# Patient Record
Sex: MALE | Race: BLACK OR AFRICAN AMERICAN | NOT HISPANIC OR LATINO | URBAN - METROPOLITAN AREA
[De-identification: names, ages, dates, MRNs, and addresses within clinical notes are randomized per-mention and may not be internally consistent; named-entity substitution may affect disease eponyms.]

---

## 2023-05-01 ENCOUNTER — OFFICE VISIT (OUTPATIENT)
Dept: PRIMARY CARE | Facility: CLINIC | Age: 63
End: 2023-05-01
Payer: COMMERCIAL

## 2023-05-01 VITALS
WEIGHT: 271.6 LBS | HEIGHT: 74 IN | BODY MASS INDEX: 34.86 KG/M2 | SYSTOLIC BLOOD PRESSURE: 130 MMHG | DIASTOLIC BLOOD PRESSURE: 80 MMHG | OXYGEN SATURATION: 96 % | HEART RATE: 77 BPM

## 2023-05-01 DIAGNOSIS — R07.89 ATYPICAL CHEST PAIN: ICD-10-CM

## 2023-05-01 DIAGNOSIS — I87.2 CHRONIC VENOUS INSUFFICIENCY OF LOWER EXTREMITY: ICD-10-CM

## 2023-05-01 DIAGNOSIS — E11.9 TYPE 2 DIABETES MELLITUS WITHOUT COMPLICATION, WITHOUT LONG-TERM CURRENT USE OF INSULIN (MULTI): ICD-10-CM

## 2023-05-01 DIAGNOSIS — E11.9 DIABETES MELLITUS WITHOUT COMPLICATION (MULTI): Primary | ICD-10-CM

## 2023-05-01 PROBLEM — E66.9 OBESITY: Status: ACTIVE | Noted: 2023-05-01

## 2023-05-01 PROBLEM — F17.200 NICOTINE DEPENDENCE: Status: ACTIVE | Noted: 2023-05-01

## 2023-05-01 PROBLEM — M10.9 GOUT: Status: ACTIVE | Noted: 2023-05-01

## 2023-05-01 PROCEDURE — 3075F SYST BP GE 130 - 139MM HG: CPT | Performed by: STUDENT IN AN ORGANIZED HEALTH CARE EDUCATION/TRAINING PROGRAM

## 2023-05-01 PROCEDURE — 4004F PT TOBACCO SCREEN RCVD TLK: CPT | Performed by: STUDENT IN AN ORGANIZED HEALTH CARE EDUCATION/TRAINING PROGRAM

## 2023-05-01 PROCEDURE — 3079F DIAST BP 80-89 MM HG: CPT | Performed by: STUDENT IN AN ORGANIZED HEALTH CARE EDUCATION/TRAINING PROGRAM

## 2023-05-01 PROCEDURE — 99214 OFFICE O/P EST MOD 30 MIN: CPT | Performed by: STUDENT IN AN ORGANIZED HEALTH CARE EDUCATION/TRAINING PROGRAM

## 2023-05-01 NOTE — PATIENT INSTRUCTIONS
The swelling in both legs is from chronic venous insufficiency .Use compression stockings and keep your legs elevated . Swelling in your legs over long term if not taken care of can result in ulcers. So, keeping your legs elevated , ideally at the level of your heart when your are sitting down , or lying down ( keeping pillows or a brick under the bedposts at the foot end ) and using compression stockings when moving is important.    Repeating the test for diabetes   advise to cut down on carbs/ starches - bread, rice , potatoes, pasta , sweets, soda etc., and increase physical activity .   Weight loss will  help in decreasing blood sugars  as well.       We are getting a cardiac stress test .   Rpt BP is within normal limits .    We are on a new system that is paperless.     Lab location for blood work :  1. Located across the office , just past the elevators .   [address]. [address].  If you are coming on a Saturday, go to [address] for the blood draw    Radiology : [address] for Xrays  You can also schedule non urgent imaging by calling .     For scheduling appts, call . You might be receiving call from central scheduling as well.    For scheduling colonoscopy, call .     For scheduling physical therapy, call [phone] ( [phone]).    For any other scheduling questions or locations, please ask the medical assistant or the .

## 2023-05-01 NOTE — PROGRESS NOTES
"Subjective   Patient ID: Domenico Boswell is a 62 y.o. male who presents for Follow-up (Pain and swelling in calves. Heart racing, flutters, and pain. ).        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  She is a critical care nurse, travel nurse.  She recommended an echocardiogram ,vascular test with reflux and a hearing test.      Visit Vitals  /80   Pulse 77   Ht 1.88 m (6' 2\")   Wt 123 kg (271 lb 9.6 oz)   SpO2 96%   BMI 34.87 kg/m²   Smoking Status Some Days   BSA 2.53 m²      No LMP for male patient.     Review of Systems    Constitutional : No feeling poorly / fevers/ chills / night sweats/ fatigue   Cardiovascular : No CP /Palpitations/ lower extremity edema / syncope   Respiratory : No Cough /LOMAS/Dyspnea at rest   MSK : As noted in HPI   Endo : No polyuria/polydipsia/ muscle weakness / sluggishness   CNS: No confusion / HA/ tingling/ numbness/ weakness of extremities  Psychiatric: No anxiety/ depression/ SI/HI    All other systems have been reviewed and are negative for complaint       Physical Exam    Constitutional : Vitals reviewed. Alert and in no distress  Cardiovascular : RRR, Normal S1, S2, No pericardial rub/ gallop, no peripheral edema   Pulmonary: No respiratory distress, CTAB   MSK : Normal gait and station , strength and tone   Skin:  hyperpigmentation of the lower extremities just above the ankles b/l noted   Pitting edema in the same area as  well right > left   Neurologic : CNs 2-12 grossly intact , no obvious FNDs  Psych : A,Ox3, normal " mood and affect      Assessment/Plan   Diagnoses and all orders for this visit:  Diabetes mellitus without complication (CMS/[facility])  Type 2 diabetes mellitus without complication, without long-term current use of insulin (CMS/[facility])  -     Hemoglobin A1C; Future  -     Basic Metabolic Panel; Future  Atypical chest pain  -     Echocardiogram stress test; Future  Chronic venous insufficiency of lower extremity  -     Compression Stockings 15-20 mmHg    62-year-old male with diabetes and obesity presents with concerns as noted in HPI.    #Chronic venous insufficiency of both bilateral lower extremities.  Gross has been the consequence of many years of standing on his feet for long hours which is his job requirement.  Compression stockings, foot and elevation advised.  Risk of ulcers if prevention is failed discussed with patient  Arterial and venous blood supply discussed with patient.  Lower extremity concerns is a venous issue      #Atypical chest pain.  Given the cardiovascular risk factors which include diabetes, age and obesity, cardiac stress has been ordered.      #Diabetes type 2: Repeat A1c and BMP to be done today.    #Obesity: Readdressed the need for weight loss.    Return to the office in November for CPE.  Or sooner if A1c is elevated.

## 2023-05-18 ENCOUNTER — TELEPHONE (OUTPATIENT)
Dept: PRIMARY CARE | Facility: CLINIC | Age: 63
End: 2023-05-18
Payer: COMMERCIAL

## 2023-05-18 DIAGNOSIS — R07.89 ATYPICAL CHEST PAIN: Primary | ICD-10-CM

## 2023-05-18 NOTE — TELEPHONE ENCOUNTER
----- Message from Nat Limon MD sent at 5/18/2023 11:23 AM EDT -----  I would like him to see Cardiology for his chest pain , referral placed   Cardiac stress test did not show any concerns

## 2023-08-17 LAB
CHOLESTEROL (MG/DL) IN SER/PLAS: NORMAL
CHOLESTEROL IN HDL (MG/DL) IN SER/PLAS: NORMAL
CHOLESTEROL/HDL RATIO: NORMAL
LDL: NORMAL
NON HDL CHOLESTEROL: NORMAL
TRIGLYCERIDE (MG/DL) IN SER/PLAS: NORMAL
VLDL: NORMAL

## 2023-08-18 LAB — LIPOPROTEIN A: 34 MG/DL

## 2023-09-07 LAB
CHOLESTEROL (MG/DL) IN SER/PLAS: 151 MG/DL (ref 0–199)
CHOLESTEROL IN HDL (MG/DL) IN SER/PLAS: 50.8 MG/DL
CHOLESTEROL/HDL RATIO: 3
LDL: 91 MG/DL (ref 0–99)
TRIGLYCERIDE (MG/DL) IN SER/PLAS: 47 MG/DL (ref 0–149)
VLDL: 9 MG/DL (ref 0–40)

## 2023-11-03 ENCOUNTER — OFFICE VISIT (OUTPATIENT)
Dept: PRIMARY CARE | Facility: CLINIC | Age: 63
End: 2023-11-03
Payer: COMMERCIAL

## 2023-11-03 VITALS
WEIGHT: 266.6 LBS | OXYGEN SATURATION: 95 % | HEIGHT: 74 IN | BODY MASS INDEX: 34.22 KG/M2 | SYSTOLIC BLOOD PRESSURE: 140 MMHG | HEART RATE: 67 BPM | DIASTOLIC BLOOD PRESSURE: 90 MMHG

## 2023-11-03 DIAGNOSIS — Z12.5 SCREENING FOR PROSTATE CANCER: ICD-10-CM

## 2023-11-03 DIAGNOSIS — Z00.00 ROUTINE GENERAL MEDICAL EXAMINATION AT A HEALTH CARE FACILITY: Primary | ICD-10-CM

## 2023-11-03 DIAGNOSIS — E11.9 DIABETES MELLITUS WITHOUT COMPLICATION (MULTI): ICD-10-CM

## 2023-11-03 DIAGNOSIS — R03.0 ELEVATED BLOOD PRESSURE READING: ICD-10-CM

## 2023-11-03 DIAGNOSIS — E11.9 TYPE 2 DIABETES MELLITUS WITHOUT COMPLICATION, WITHOUT LONG-TERM CURRENT USE OF INSULIN (MULTI): ICD-10-CM

## 2023-11-03 LAB
ANION GAP SERPL CALC-SCNC: 12 MMOL/L (ref 10–20)
BUN SERPL-MCNC: 15 MG/DL (ref 6–23)
CALCIUM SERPL-MCNC: 9.2 MG/DL (ref 8.6–10.6)
CHLORIDE SERPL-SCNC: 106 MMOL/L (ref 98–107)
CO2 SERPL-SCNC: 28 MMOL/L (ref 21–32)
CREAT SERPL-MCNC: 1.05 MG/DL (ref 0.5–1.3)
EST. AVERAGE GLUCOSE BLD GHB EST-MCNC: 128 MG/DL
GFR SERPL CREATININE-BSD FRML MDRD: 80 ML/MIN/1.73M*2
GLUCOSE SERPL-MCNC: 76 MG/DL (ref 74–99)
HBA1C MFR BLD: 6.1 %
POTASSIUM SERPL-SCNC: 4.7 MMOL/L (ref 3.5–5.3)
PSA SERPL-MCNC: 0.47 NG/ML
SODIUM SERPL-SCNC: 141 MMOL/L (ref 136–145)

## 2023-11-03 PROCEDURE — 99214 OFFICE O/P EST MOD 30 MIN: CPT | Performed by: STUDENT IN AN ORGANIZED HEALTH CARE EDUCATION/TRAINING PROGRAM

## 2023-11-03 PROCEDURE — 80048 BASIC METABOLIC PNL TOTAL CA: CPT

## 2023-11-03 PROCEDURE — 83036 HEMOGLOBIN GLYCOSYLATED A1C: CPT

## 2023-11-03 PROCEDURE — 84153 ASSAY OF PSA TOTAL: CPT

## 2023-11-03 PROCEDURE — 3078F DIAST BP <80 MM HG: CPT | Performed by: STUDENT IN AN ORGANIZED HEALTH CARE EDUCATION/TRAINING PROGRAM

## 2023-11-03 PROCEDURE — 3074F SYST BP LT 130 MM HG: CPT | Performed by: STUDENT IN AN ORGANIZED HEALTH CARE EDUCATION/TRAINING PROGRAM

## 2023-11-03 PROCEDURE — 99396 PREV VISIT EST AGE 40-64: CPT | Performed by: STUDENT IN AN ORGANIZED HEALTH CARE EDUCATION/TRAINING PROGRAM

## 2023-11-03 PROCEDURE — 4004F PT TOBACCO SCREEN RCVD TLK: CPT | Performed by: STUDENT IN AN ORGANIZED HEALTH CARE EDUCATION/TRAINING PROGRAM

## 2023-11-03 PROCEDURE — 36415 COLL VENOUS BLD VENIPUNCTURE: CPT

## 2023-11-03 NOTE — PROGRESS NOTES
"Subjective   Patient ID: Domenico Boswell is a 63 y.o. male who presents for Annual Exam (CPE. Pt declined flu shot. ).    Last Physical : ____ Years ago     Pt's PMH, PSH, SH, FH , meds and allergies was obtained / reviewed and updated .     Dental  Visits : Y  Vision issues : N  Hearing issues : N    Immunizations : Y    Diet :  Could be better   Exercise:  Not regularly  Weight concerns : None    Alcohol: as noted in   Tobacco: as noted in   Recreational drugs :  None /as noted in          ==================================    Visit Vitals  /90 (BP Location: Left arm, Patient Position: Sitting)   Pulse 67   Ht 1.88 m (6' 2\")   Wt 121 kg (266 lb 9.6 oz)   SpO2 95%   BMI 34.23 kg/m²   Smoking Status Some Days   BSA 2.51 m²      =====================  Review of Systems:    Constitutional: no chills, no fever and no night sweats.     Eyes: no blurred vision and no eyesight problems.     ENT: no hearing loss, no nasal congestion, no nasal discharge, no hoarseness and no sore throat.     Cardiovascular: no chest pain, no intermittent leg claudication, no lower extremity edema, no palpitations and no syncope.     Respiratory: no cough, no shortness of breath during exertion, no shortness of breath at rest and no wheezing.     Gastrointestinal: no abdominal pain, no constipation, no blood in stools, no diarrhea, no melena, no nausea, no rectal pain and no vomiting.     Genitourinary: no dysuria, no change in urinary frequency, no urinary hesitancy and no feelings of urinary urgency.     Musculoskeletal: no arthralgias, no back pain and no myalgias.     Integumentary: no new skin lesions and no rashes.     Neurological: no difficulty walking, no headache, no limb weakness, no numbness and no tingling.     Psychiatric: no anxiety, no depression, no anhedonia and no substance use disorders.     Endocrine: no recent weight gain and no recent weight loss.     Hematologic/Lymphatic: no tendency for easy bruising " and no swollen glands.          All other systems have been reviewed and are negative for complaint.    =====================================================    Physical exam :    Constitutional: Alert and in no acute distress. Well developed, well nourished.     Eyes: Normal external exam. Pupils were equal in size, round, reactive to light (PERRL) with normal accommodation and extraocular movements intact (EOMI).     Ears, Nose, Mouth, and Throat: External inspection of ears and nose: Normal.  Otoscopic examination: Normal.      Neck: No neck mass was observed. Supple.     Cardiovascular: Heart rate and rhythm were normal, normal S1 and S2, no gallops, no murmurs and no pericardial rub    Pulmonary: No respiratory distress. Clear bilateral breath sounds.     Abdomen: Soft nontender; no abdominal mass palpated. No organomegaly.     Musculoskeletal: No joint swelling seen, normal movements of all extremities. Range of motion: Normal.  Muscle strength/tone: Normal.      Neurologic: Deep tendon reflexes were 2+ and symmetric. Sensation: Normal.     Psychiatric: Judgment and insight: Intact. Mood and affect: Normal.        Assessment/Plan    Diagnoses and all orders for this visit:  Routine general medical examination at a health care facility  Diabetes mellitus without complication (CMS/HCC)  Screening for prostate cancer  -     Prostate Specific Antigen, Screen; Future  Elevated blood pressure reading         62y/o male presents for APE      DM2 : due for A1c   Bps in both arms as noted in vitals        HM :   Vaccines: Declined  -Tdap :  -Flu :   -Shingles :      Cancer screenings:    -Colonoscopy:  done 6/2023   -PSA: ordered    General preventive care discussed which includes regular exercise, healthy eating habits, to include more of plant based diet, to include foods of all colors  , limiting excessive red meat intake , staying active to maintain a weight that is appropriate for his/ her height / making efforts  to reach an ideal weight for height,  avoidance of smoking, excess alcohol consumption, avoidance of recreational drugs, safe and responsible sexual relationships and practices.     Calcium + Vit D supplements recommended   Regular exercise recommended   Healthy eating choices discussed and recommended   BMI ( Body mass index ) discussed and encouraged weight loss through the above discussed lifestyle modifications .     Conditions addressed and mgmt as noted above.  Pertinent labs, images/ imaging reports , chart review was done .   Age appropriate labs / labs for mgmt of chronic medical conditions ordered, further mgmt pending the results.       RTOT in 3months , bring your home monitor

## 2023-11-28 ENCOUNTER — ANCILLARY PROCEDURE (OUTPATIENT)
Dept: RADIOLOGY | Facility: CLINIC | Age: 63
End: 2023-11-28
Payer: COMMERCIAL

## 2023-11-28 DIAGNOSIS — R06.00 DYSPNEA, UNSPECIFIED: ICD-10-CM

## 2023-11-28 DIAGNOSIS — R42 DIZZINESS AND GIDDINESS: ICD-10-CM

## 2023-11-28 DIAGNOSIS — R07.89 OTHER CHEST PAIN: ICD-10-CM

## 2023-11-28 PROCEDURE — 75571 CT HRT W/O DYE W/CA TEST: CPT

## 2023-12-01 ENCOUNTER — TELEPHONE (OUTPATIENT)
Dept: CARDIOLOGY | Facility: CLINIC | Age: 63
End: 2023-12-01
Payer: COMMERCIAL

## 2023-12-01 NOTE — TELEPHONE ENCOUNTER
----- Message from Buzz Rizvi MD PhD sent at 12/1/2023  2:04 PM EST -----  Notify patient of 0 calcium score result. Domenico Boswell  can follow up as scheduled.

## 2024-02-06 ENCOUNTER — APPOINTMENT (OUTPATIENT)
Dept: PRIMARY CARE | Facility: CLINIC | Age: 64
End: 2024-02-06
Payer: COMMERCIAL

## 2024-02-27 ENCOUNTER — APPOINTMENT (OUTPATIENT)
Dept: PRIMARY CARE | Facility: CLINIC | Age: 64
End: 2024-02-27
Payer: COMMERCIAL

## 2024-03-19 ENCOUNTER — LAB (OUTPATIENT)
Dept: LAB | Facility: LAB | Age: 64
End: 2024-03-19
Payer: COMMERCIAL

## 2024-03-19 ENCOUNTER — OFFICE VISIT (OUTPATIENT)
Dept: PRIMARY CARE | Facility: CLINIC | Age: 64
End: 2024-03-19
Payer: COMMERCIAL

## 2024-03-19 VITALS
WEIGHT: 268.2 LBS | BODY MASS INDEX: 34.42 KG/M2 | HEART RATE: 74 BPM | DIASTOLIC BLOOD PRESSURE: 68 MMHG | SYSTOLIC BLOOD PRESSURE: 123 MMHG | HEIGHT: 74 IN | OXYGEN SATURATION: 96 %

## 2024-03-19 DIAGNOSIS — L97.929 ULCER OF LEFT LOWER EXTREMITY, UNSPECIFIED ULCER STAGE (MULTI): Primary | ICD-10-CM

## 2024-03-19 DIAGNOSIS — E11.9 DIABETES MELLITUS WITHOUT COMPLICATION (MULTI): ICD-10-CM

## 2024-03-19 DIAGNOSIS — L97.919 ULCER OF RIGHT LOWER EXTREMITY, UNSPECIFIED ULCER STAGE (MULTI): ICD-10-CM

## 2024-03-19 PROCEDURE — 99215 OFFICE O/P EST HI 40 MIN: CPT | Performed by: STUDENT IN AN ORGANIZED HEALTH CARE EDUCATION/TRAINING PROGRAM

## 2024-03-19 PROCEDURE — 3074F SYST BP LT 130 MM HG: CPT | Performed by: STUDENT IN AN ORGANIZED HEALTH CARE EDUCATION/TRAINING PROGRAM

## 2024-03-19 PROCEDURE — 36415 COLL VENOUS BLD VENIPUNCTURE: CPT

## 2024-03-19 PROCEDURE — 3078F DIAST BP <80 MM HG: CPT | Performed by: STUDENT IN AN ORGANIZED HEALTH CARE EDUCATION/TRAINING PROGRAM

## 2024-03-19 PROCEDURE — 83036 HEMOGLOBIN GLYCOSYLATED A1C: CPT

## 2024-03-19 RX ORDER — GABAPENTIN 100 MG/1
100 CAPSULE ORAL 3 TIMES DAILY
Qty: 90 CAPSULE | Refills: 3 | Status: SHIPPED | OUTPATIENT
Start: 2024-03-19 | End: 2024-09-15

## 2024-03-19 NOTE — PROGRESS NOTES
"Subjective   Patient ID: Domenico Boswell is a 63 y.o. male who presents for Follow-up (Ulcer on both legs. ).        HPI  62 y/o male with DM2          Visit Vitals  /68   Pulse 74   Ht 1.88 m (6' 2\")   Wt 122 kg (268 lb 3.2 oz)   SpO2 96%   BMI 34.43 kg/m²   Smoking Status Some Days   BSA 2.52 m²      No LMP for male patient.     Review of Systems    Constitutional : No feeling poorly / fevers/ chills / night sweats/ fatigue   Cardiovascular : No CP /Palpitations/ lower extremity edema / syncope   Respiratory : No Cough /LOMAS/Dyspnea at rest   Gastrointestinal : No abd pain / N/V  No bloody stools/ melena / constipation  Endo : No polyuria/polydipsia/ muscle weakness / sluggishness   CNS: No confusion / HA/ tingling/ numbness/ weakness of extremities  Psychiatric: No anxiety/ depression/ SI/HI    All other systems have been reviewed and are negative for complaint       Physical Exam    Constitutional : Vitals reviewed. Alert and in no distress  Cardiovascular : RRR, Normal S1, S2, No pericardial rub/ gallop, no peripheral edema   Pulmonary: No respiratory distress, CTAB   MSK : Normal gait and station , strength and tone   Skin : Hyperpigmented areas of the medial side of the b/l Le above the medial  malleolus with central scab / crust note, no wet areas noted today   Neurologic : CNs 2-12 grossly intact , no obvious FNDs  Psych : A,Ox3, normal mood and affect      Assessment/Plan   Diagnoses and all orders for this visit:  Ulcer of left lower extremity, unspecified ulcer stage (CMS/[facility])  -     Referral to Wound Clinic; Future  -     Vascular US ankle brachial index (ALISON) with exercise; Future  -     Referral to Vascular Medicine; Future  -     Compression Stockings 15-20 mmHg  -     gabapentin (Neurontin) 100 mg capsule; Take 1 capsule (100 mg) by mouth 3 times a day.  Diabetes mellitus without complication (CMS/HCC)  -     Hemoglobin A1C; Future  Ulcer of right lower extremity, unspecified ulcer stage " (CMS/[facility])  -     Referral to Wound Clinic; Future  -     Vascular US ankle brachial index (ALISON) with exercise; Future  -     Referral to Vascular Medicine; Future  -     Compression Stockings 15-20 mmHg  -     gabapentin (Neurontin) 100 mg capsule; Take 1 capsule (100 mg) by mouth 3 times a day.    Ulcers of the Le, b/l medial side   Arterial vs venous   E &M  mgmt as above         Conditions addressed and mgmt as noted above.  Pertinent labs, images/ imaging reports , chart review was done .   Age appropriate labs / labs for mgmt of chronic medical conditions ordered, further mgmt pending the results.

## 2024-03-20 LAB
EST. AVERAGE GLUCOSE BLD GHB EST-MCNC: 134 MG/DL
HBA1C MFR BLD: 6.3 %

## 2024-03-27 ENCOUNTER — ANCILLARY PROCEDURE (OUTPATIENT)
Dept: VASCULAR MEDICINE | Facility: CLINIC | Age: 64
End: 2024-03-27
Payer: COMMERCIAL

## 2024-03-27 DIAGNOSIS — L97.919 ULCER OF RIGHT LOWER EXTREMITY, UNSPECIFIED ULCER STAGE (MULTI): ICD-10-CM

## 2024-03-27 DIAGNOSIS — L97.111 NON-PRESSURE CHRONIC ULCER OF RIGHT THIGH LIMITED TO BREAKDOWN OF SKIN (MULTI): ICD-10-CM

## 2024-03-27 DIAGNOSIS — L97.929 ULCER OF LEFT LOWER EXTREMITY, UNSPECIFIED ULCER STAGE (MULTI): ICD-10-CM

## 2024-03-27 PROCEDURE — 93924 LWR XTR VASC STDY BILAT: CPT | Performed by: INTERNAL MEDICINE

## 2024-03-27 PROCEDURE — 93924 LWR XTR VASC STDY BILAT: CPT

## 2024-03-28 ENCOUNTER — APPOINTMENT (OUTPATIENT)
Dept: VASCULAR MEDICINE | Facility: CLINIC | Age: 64
End: 2024-03-28
Payer: COMMERCIAL

## 2024-04-08 PROBLEM — R03.0 ELEVATED BLOOD PRESSURE READING: Status: ACTIVE | Noted: 2024-04-08

## 2024-04-08 PROBLEM — M20.42 HAMMER TOE OF LEFT FOOT: Status: ACTIVE | Noted: 2024-04-08

## 2024-04-08 PROBLEM — H91.93 HEARING DIFFICULTY OF BOTH EARS: Status: ACTIVE | Noted: 2024-04-08

## 2024-04-08 PROBLEM — A59.9 TRICHOMONAS INFECTION: Status: ACTIVE | Noted: 2024-04-08

## 2024-04-08 PROBLEM — I87.2 CHRONIC VENOUS INSUFFICIENCY: Status: ACTIVE | Noted: 2024-04-08

## 2024-04-08 PROBLEM — E55.9 VITAMIN D DEFICIENCY: Status: ACTIVE | Noted: 2024-04-08

## 2024-04-08 PROBLEM — R06.00 DYSPNEA: Status: ACTIVE | Noted: 2024-04-08

## 2024-04-08 PROBLEM — M25.372 ANKLE INSTABILITY, LEFT: Status: ACTIVE | Noted: 2024-04-08

## 2024-04-08 PROBLEM — H61.23 IMPACTED CERUMEN OF BOTH EARS: Status: ACTIVE | Noted: 2024-04-08

## 2024-04-08 PROBLEM — R07.89 ATYPICAL CHEST PAIN: Status: ACTIVE | Noted: 2023-11-28

## 2024-04-08 PROBLEM — R42 DIZZINESS: Status: ACTIVE | Noted: 2023-11-28

## 2024-04-08 PROBLEM — M76.72 PERONEAL TENDINITIS OF LEFT LOWER EXTREMITY: Status: ACTIVE | Noted: 2024-04-08

## 2024-04-08 PROBLEM — I87.2 VENOUS INSUFFICIENCY OF LOWER EXTREMITY: Status: ACTIVE | Noted: 2024-04-08

## 2024-04-08 PROBLEM — E78.00 ELEVATED LDL CHOLESTEROL LEVEL: Status: ACTIVE | Noted: 2024-04-08

## 2024-04-08 PROBLEM — R29.6 RECURRENT FALLS: Status: ACTIVE | Noted: 2024-04-08

## 2024-04-08 PROBLEM — H90.3 BILATERAL SENSORINEURAL HEARING LOSS: Status: ACTIVE | Noted: 2024-04-08

## 2024-04-08 PROBLEM — L97.909 ULCER OF LOWER EXTREMITY (MULTI): Status: ACTIVE | Noted: 2024-04-08

## 2024-04-08 PROBLEM — R58: Status: ACTIVE | Noted: 2024-04-08

## 2024-04-08 PROBLEM — Q66.6 PES VALGUS OF LEFT FOOT: Status: ACTIVE | Noted: 2024-04-08

## 2024-04-15 ENCOUNTER — OFFICE VISIT (OUTPATIENT)
Dept: WOUND CARE | Facility: CLINIC | Age: 64
End: 2024-04-15
Payer: COMMERCIAL

## 2024-04-15 VITALS
RESPIRATION RATE: 18 BRPM | SYSTOLIC BLOOD PRESSURE: 162 MMHG | DIASTOLIC BLOOD PRESSURE: 95 MMHG | WEIGHT: 267 LBS | BODY MASS INDEX: 34.27 KG/M2 | HEART RATE: 66 BPM | HEIGHT: 74 IN

## 2024-04-15 DIAGNOSIS — L97.221: Primary | ICD-10-CM

## 2024-04-15 DIAGNOSIS — I87.333 CHRONIC VENOUS HYPERTENSION (IDIOPATHIC) WITH ULCER AND INFLAMMATION OF BILATERAL LOWER EXTREMITY (MULTI): ICD-10-CM

## 2024-04-15 DIAGNOSIS — L97.211: ICD-10-CM

## 2024-04-15 DIAGNOSIS — L97.929 ULCER OF LEFT LOWER EXTREMITY, UNSPECIFIED ULCER STAGE (MULTI): ICD-10-CM

## 2024-04-15 DIAGNOSIS — L97.919 ULCER OF RIGHT LOWER EXTREMITY, UNSPECIFIED ULCER STAGE (MULTI): ICD-10-CM

## 2024-04-15 PROCEDURE — 3044F HG A1C LEVEL LT 7.0%: CPT | Performed by: INTERNAL MEDICINE

## 2024-04-15 PROCEDURE — 3079F DIAST BP 80-89 MM HG: CPT | Performed by: INTERNAL MEDICINE

## 2024-04-15 PROCEDURE — 3077F SYST BP >= 140 MM HG: CPT | Performed by: INTERNAL MEDICINE

## 2024-04-15 PROCEDURE — 99204 OFFICE O/P NEW MOD 45 MIN: CPT | Performed by: INTERNAL MEDICINE

## 2024-04-15 PROCEDURE — 99214 OFFICE O/P EST MOD 30 MIN: CPT | Performed by: INTERNAL MEDICINE

## 2024-04-15 PROCEDURE — 11042 DBRDMT SUBQ TIS 1ST 20SQCM/<: CPT | Performed by: INTERNAL MEDICINE

## 2024-04-15 NOTE — PROGRESS NOTES
"REQUESTING PHYSICIAN: Nat Limon MD PCP  DOS:  04/15/2023  REASON FOR CONSULT: calf ulcers    Patient ID: Domenico Boswell is a 63 y.o. male     HPI    HISTORY OF PRESENT ILLNESS:     64 yo man here for evaluation of vivian calf ulcers. Reports these started approx 7-8 months ago. Reports works a lot - wear boots. Thinks some of this is rubbing from the pants leg. No prev h/o similar issues. Does have a h/o ankles turning brown - this has been for a few years. Currently - keeping them clean, using lotion. Reports can not use compression stocking bc it pulls the scab off. Reports has had compression stockings for approx 5 years - does not wear them. These are 20 mmHg.     Denies a h/o DVT. +FH of blood clots in his mother and sister who dies with a blood clot after a stroke.     PMH/PSH:    Obesity  HTN    FAMILY HISTORY:     + VTE - mother and sister as noted  ? CTD - grandmother  No AAA    SOCIAL HISTORY:     Tobacco smoke cigars occasionally  Employment works in [employer]    REVIEW OF SYSTEMS:     No fevers, chills, weight is stable  No sores, +ulcers, rashes, +skin lesions  No HA, SZ, syncope, stroke, TIA  No CP, chest pressure  No cough, SOB  occ ABD pain  +edema - longstanding, no calf pain  No ambulatory leg pain  No parasthesias    PHYSICAL EXAMINATION:     ARRIVAL:   Ambulating  TRANSFER:   None    Gen: Appears well, NAD  HEENT: WNL  No carotid bruits  Chest: CTA  CVS: regular without murmur or gallop  Abd: soft, NT/ND, no bruits, no AAA  Ext: brawny edema, nontender  Skin: LDS and hemosiderin vivian gaiter area with ulcers as noted  Pulses: DP 2+; PT 2+  Neuro: grossly normal, CN intact, TONY x 4  Mood and affect appropriate    Extremity Temperature:    RIGHT: normal  LEFT: normal    Edema: brawny edema vivian    Vitals  BP (!) 162/95   Pulse 66   Resp 18   Ht 1.88 m (6' 2\")   Wt 121 kg (267 lb)   BMI 34.28 kg/m²     ADDITIONAL DATA:       Current Dressing:  Who is performing the dressing change:  " No dressing  Dressing applied: NA  Dressing Frequency:  NA    Edema Management   Compression:  Right: None CALF 43.5 CM   Left: None   CALF 41.5 CM     Other Modality  Sleeping in Bed: Yes  Elevating FOB:  AMAP    Offloading/Pressure Relief  Activity Level:  Ad leonel  Protection Devices:  NA    Offloading/Pressure Relief Effective?     Wound Assessment:    Wound 04/15/24 Venous Ulcer Leg Left;Medial (Active)   Date First Assessed: 04/15/24   Primary Wound Type: Venous Ulcer  Location: Leg  Wound Location Orientation: Left;Medial      Assessments 4/15/2024  9:15 AM   Wound Image     Site Assessment Pink;Fibrinous;Eschar   Melissa-Wound Assessment Intact;Erythematous   Non-staged Wound Description Full thickness   Wound Length (cm) 1.5 cm   Wound Width (cm) 1.4 cm   Wound Surface Area (cm^2) 2.1 cm^2   Wound Depth (cm) 0.1 cm   Wound Volume (cm^3) 0.21 cm^3   Wound Bed Granulation (%) 10 %   Wound Bed Eschar (%) 90 %   Drainage Description Serosanguineous   Drainage Amount Scant       No associated orders.       Wound 04/15/24 Venous Ulcer Leg Left;Dorsal (Active)   Date First Assessed: 04/15/24   Primary Wound Type: Venous Ulcer  Location: Leg  Wound Location Orientation: Left;Dorsal      Assessments 4/15/2024  9:16 AM   Wound Image     Site Assessment Eschar   Melissa-Wound Assessment Intact   Non-staged Wound Description Not applicable   Wound Length (cm) 1.4 cm   Wound Width (cm) 1.2 cm   Wound Surface Area (cm^2) 1.68 cm^2   Wound Bed Eschar (%) 100 %   Drainage Description None   Drainage Amount None       No associated orders.       Wound 04/15/24 Venous Ulcer Leg Left;Dorsal;Distal (Active)   Date First Assessed: 04/15/24   Primary Wound Type: Venous Ulcer  Location: Leg  Wound Location Orientation: Left;Dorsal;Distal      Assessments 4/15/2024  9:17 AM   Wound Image     Site Assessment Eschar   Melissa-Wound Assessment Intact   Non-staged Wound Description Not applicable   Wound Length (cm) 1.6 cm   Wound Width (cm) 1.6  cm   Wound Surface Area (cm^2) 2.56 cm^2   Wound Bed Eschar (%) 100 %   Drainage Amount None       No associated orders.       Wound 04/15/24 Venous Ulcer Leg Right;Medial (Active)   Date First Assessed: 04/15/24   Primary Wound Type: Venous Ulcer  Location: Leg  Wound Location Orientation: Right;Medial      Assessments 4/15/2024  9:17 AM   Wound Image     Site Assessment Eschar   Melissa-Wound Assessment Intact   Non-staged Wound Description Not applicable   Wound Length (cm) 1.6 cm   Wound Width (cm) 0.6 cm   Wound Surface Area (cm^2) 0.96 cm^2   Wound Bed Eschar (%) 100 %   Drainage Amount None       No associated orders.       Wound 04/15/24 Venous Ulcer Leg Right;Medial;Distal (Active)   Date First Assessed: 04/15/24   Primary Wound Type: Venous Ulcer  Location: Leg  Wound Location Orientation: Right;Medial;Distal      Assessments 4/15/2024  9:22 AM   Wound Image     Site Assessment Red;Eschar   Melissa-Wound Assessment Intact   Non-staged Wound Description Full thickness   Wound Length (cm) 4 cm   Wound Width (cm) 4.2 cm   Wound Surface Area (cm^2) 16.8 cm^2   Wound Depth (cm) 0.1 cm   Wound Volume (cm^3) 1.68 cm^3   Wound Bed Eschar (%) 95 %   Drainage Description Serous   Drainage Amount Small       No associated orders.          Debridement   Wound 04/15/24 Venous Ulcer Leg Left;Medial    Performed by: Fransisca Jhaveri MD  Authorized by: Fransisca Jhaveri MD      Consent   Consent obtained? verbal  Consent given by: patient    Debridement Details  Performed by: physician  Debridement type: surgical  Level of debridement: subcutaneous tissue  Pain control: lidocaine 2%  Pain control administration type: topical    Pre-debridement measurements  Length (cm): 1.5  Width (cm): 1.4  Depth (cm): 0.1  Surface Area (cm^2): 2.1    Post-debridement measurements  Length (cm): 1.2  Width (cm): 1  Depth (cm): 0.1  Percent debrided: 80%  Surface Area (cm^2): 1.2  Area Debrided (cm^2): 0.96  Volume (cm^3): 0.12    Tissue and  other material debrided: subcutaneous tissue  Devitalized tissue debrided: fibrin, slough and eschar  Instrument(s) utilized: blade  Bleeding: small  Hemostasis obtained with: not applicable  Response to treatment: procedure was tolerated well    Debridement   Wound 04/15/24 Venous Ulcer Leg Left;Dorsal    Performed by: Fransisca Jhaveri MD  Authorized by: Fransisca Jhaveri MD      Consent   Consent obtained? verbal  Consent given by: patient    Debridement Details  Performed by: physician  Debridement type: surgical  Level of debridement: subcutaneous tissue  Pain control: lidocaine 2%  Pain control administration type: topical    Pre-debridement measurements  Length (cm): 1.4  Width (cm): 1.2  Surface Area (cm^2): 1.68    Post-debridement measurements  Length (cm): 1.1  Width (cm): 1  Depth (cm): 0.1  Percent debrided: 100%  Surface Area (cm^2): 1.1  Area Debrided (cm^2): 1.1  Volume (cm^3): 0.11    Tissue and other material debrided: subcutaneous tissue  Comment regarding debrided tissue: eschar  Devitalized tissue debrided: fibrin and slough  Instrument(s) utilized: curette  Bleeding: small  Hemostasis obtained with: not applicable  Response to treatment: procedure was tolerated well    Debridement   Wound 04/15/24 Venous Ulcer Leg Left;Dorsal;Distal    Performed by: Fransisca Jhaveri MD  Authorized by: Fransisca Jhaveri MD      Consent   Consent obtained? verbal  Consent given by: patient    Debridement Details  Performed by: physician  Debridement type: surgical  Level of debridement: subcutaneous tissue  Pain control: lidocaine 2%  Pain control administration type: topical    Pre-debridement measurements  Length (cm): 1.6  Width (cm): 1.6  Surface Area (cm^2): 2.56    Post-debridement measurements  Length (cm): 0.9  Width (cm): 0.6  Depth (cm): 0.1  Percent debrided: 100%  Surface Area (cm^2): 0.54  Area Debrided (cm^2): 0.54  Volume (cm^3): 0.05    Tissue and other material debrided: subcutaneous tissue  Comment  regarding debrided tissue: eschar  Devitalized tissue debrided: fibrin and slough  Instrument(s) utilized: blade  Bleeding: small  Hemostasis obtained with: not applicable  Response to treatment: procedure was tolerated well    Debridement   Wound 04/15/24 Venous Ulcer Leg Right;Medial    Performed by: Fransisca Jhaveri MD  Authorized by: Fransisca Jhaveri MD      Consent   Consent obtained? verbal  Consent given by: patient    Debridement Details  Performed by: physician  Debridement type: surgical  Level of debridement: subcutaneous tissue  Pain control: lidocaine 2%  Pain control administration type: topical    Pre-debridement measurements  Length (cm): 1.6  Width (cm): 0.6  Surface Area (cm^2): 0.96    Post-debridement measurements  Length (cm): 0.9  Width (cm): 0.4  Depth (cm): 0.1  Percent debrided: 40%  Surface Area (cm^2): 0.36  Area Debrided (cm^2): 0.14  Volume (cm^3): 0.04    Tissue and other material debrided: subcutaneous tissue  Comment regarding debrided tissue: eschar  Devitalized tissue debrided: fibrin and slough  Instrument(s) utilized: blade  Bleeding: small  Hemostasis obtained with: not applicable  Response to treatment: procedure was tolerated well    Debridement   Wound 04/15/24 Venous Ulcer Leg Right;Medial;Distal    Performed by: Fransisca Jhaveri MD  Authorized by: Fransisca Jhaveri MD      Consent   Consent obtained? verbal  Consent given by: patient    Debridement Details  Performed by: physician  Debridement type: surgical  Level of debridement: subcutaneous tissue  Pain control: lidocaine 2%  Pain control administration type: topical    Pre-debridement measurements  Length (cm): 4  Width (cm): 4.2  Depth (cm): 0.1  Surface Area (cm^2): 16.8    Post-debridement measurements  Length (cm): 3  Width (cm): 4  Depth (cm): 0.2  Percent debrided: 30%  Surface Area (cm^2): 12  Area Debrided (cm^2): 3.6  Volume (cm^3): 2.4    Tissue and other material debrided: subcutaneous tissue  Comment regarding  debrided tissue: eschar  Devitalized tissue debrided: fibrin and slough  Instrument(s) utilized: blade and forceps  Bleeding: small  Hemostasis obtained with: not applicable  Response to treatment: procedure was tolerated well        Assessment/Plan   CVHTN and vivian gaiter area ulcers as noted; Eucerin or lubriderm for skin care. Moist promogran and DSD for the ulcers change every 1-2 days. 20-30 mmHg stocking AM to bed daily. Elevate AMAP. Elevate the foot of the bed. VI scan pending. Follow up 3 weeks.    Visit Orders  Vascular Study Required: yes -VI  Labs Required: n  Radiology Imaging Required: n  Consultation Required: n  Rx Provided:   Changes to Plan of Care: y      New Orders:  Wash: keith  Irrigate/Soak:  Skin Care: eucerin or lubriderm  Dressing:  moist promogran and DSD every 1-2 days  Compression: 20-30 mmHg stocking - ulcer pro when available  Offloading:     Discharge Planning:    Follow Up: 3 weeks  Nurse Visit: prn      General Instructions:  Center RN to apply EMLA/Lidocaine 1% jelly/LMX  topically to wound(s) prior to debridement by physician.  Center RN my see patient as a nurse consult in my absence.      RN Post Procedure Note:      Eucerin to both legs, moist promogran, DSD applied to wound bases with double tubigrip.   Juzo Ulcer Pro ordered and supplies ordered. LOREN Strong RN    [facility]/Assisted Living  Agency/Facility:   days/week  Contacted Regarding Changes:

## 2024-04-15 NOTE — PATIENT INSTRUCTIONS
Assessment/Plan   CVHTN and vivian gaiter area ulcers as noted; Eucerin or lubriderm for skin care. Moist promogran and DSD for the ulcers change every 1-2 days. 20-30 mmHg stocking AM to bed daily. Elevate AMAP. Elevate the foot of the bed. VI scan pending. Follow up 3 weeks.    Visit Orders  Vascular Study Required: yes -VI  Labs Required: n  Radiology Imaging Required: n  Consultation Required: n  Rx Provided:   Changes to Plan of Care: y      New Orders:  Wash: keith  Irrigate/Soak:  Skin Care: eucerin or lubriderm  Dressing:  moist promogran and DSD every 1-2 days  Compression: 20-30 mmHg stocking - ulcer pro when available  Offloading:     Discharge Planning:    Follow Up: 3 weeks  Nurse Visit: prn

## 2024-04-17 ENCOUNTER — OFFICE VISIT (OUTPATIENT)
Dept: CARDIOLOGY | Facility: CLINIC | Age: 64
End: 2024-04-17
Payer: COMMERCIAL

## 2024-04-17 VITALS
BODY MASS INDEX: 34.08 KG/M2 | DIASTOLIC BLOOD PRESSURE: 84 MMHG | OXYGEN SATURATION: 97 % | HEIGHT: 74 IN | HEART RATE: 65 BPM | WEIGHT: 265.6 LBS | SYSTOLIC BLOOD PRESSURE: 128 MMHG

## 2024-04-17 DIAGNOSIS — R07.89 ATYPICAL CHEST PAIN: ICD-10-CM

## 2024-04-17 DIAGNOSIS — R03.0 ELEVATED BLOOD PRESSURE READING: Primary | ICD-10-CM

## 2024-04-17 DIAGNOSIS — E78.00 ELEVATED LDL CHOLESTEROL LEVEL: ICD-10-CM

## 2024-04-17 PROCEDURE — 3044F HG A1C LEVEL LT 7.0%: CPT | Performed by: STUDENT IN AN ORGANIZED HEALTH CARE EDUCATION/TRAINING PROGRAM

## 2024-04-17 PROCEDURE — 3079F DIAST BP 80-89 MM HG: CPT | Performed by: STUDENT IN AN ORGANIZED HEALTH CARE EDUCATION/TRAINING PROGRAM

## 2024-04-17 PROCEDURE — 3074F SYST BP LT 130 MM HG: CPT | Performed by: STUDENT IN AN ORGANIZED HEALTH CARE EDUCATION/TRAINING PROGRAM

## 2024-04-17 PROCEDURE — 99213 OFFICE O/P EST LOW 20 MIN: CPT | Performed by: STUDENT IN AN ORGANIZED HEALTH CARE EDUCATION/TRAINING PROGRAM

## 2024-04-17 NOTE — PROGRESS NOTES
"Chief Complaint:   venous insufficiency (8 month check up)     History Of Present Illness:    Domenico Boswell is a 63 y.o. male returns to clinic for follow-up appointment.  He underwent a calcium score which came back at 0.  His cholesterol shows an LDL of 91, HDL of 50.8, triglycerides of 47.  Blood pressures are better controlled today at 128/84 saturation 97% on room air.  He is asymptomatic.  He had testing of his legs which were negative for occlusive disease.  He is doing well other out.  No chest discomfort.  No upcoming procedures.  Endorse some dietary indiscretion.     Last Recorded Vitals:  Vitals:    04/17/24 1345   BP: 128/84   BP Location: Left arm   Patient Position: Sitting   BP Cuff Size: Large adult   Pulse: 65   SpO2: 97%   Weight: 120 kg (265 lb 9.6 oz)   Height: 1.88 m (6' 2\")       Review of Systems  ROS      Allergies:  Patient has no known allergies.    Outpatient Medications:  Current Outpatient Medications   Medication Instructions    gabapentin (NEURONTIN) 100 mg, oral, 3 times daily       Physical Exam:  General: No acute distress,  A&O x3  Skin: Warm and dry  Neck: JVD is not elevated  ENT: Moist mucous membranes no lesions appreciated  Pulmonary: CTAB  Cards: Regular rate rhythm, no murmurs gallops or rubs appreciated normal S1-S2  Abdomen: Soft nontender nondistended  Extremities: No edema or cyanosis  Psych: Appropriate mood and affect        Last Labs:  CBC -  Lab Results   Component Value Date    WBC 5.3 11/07/2022    HGB 13.9 11/07/2022    HCT 42.6 11/07/2022    MCV 87 11/07/2022     11/07/2022       CMP -  Lab Results   Component Value Date    CALCIUM 9.2 11/03/2023    PROT 7.4 11/07/2022    ALBUMIN 4.4 11/07/2022    AST 21 11/07/2022    ALT 18 11/07/2022    ALKPHOS 30 (L) 11/07/2022    BILITOT 0.6 11/07/2022       LIPID PANEL -   Lab Results   Component Value Date    CHOL 151 09/07/2023    TRIG 47 09/07/2023    HDL 50.8 09/07/2023    CHHDL 3.0 09/07/2023    LDLF 91 " 09/07/2023    VLDL 9 09/07/2023    NHDL CANCELED 08/15/2023       RENAL FUNCTION PANEL -   Lab Results   Component Value Date    GLUCOSE 76 11/03/2023     11/03/2023    K 4.7 11/03/2023     11/03/2023    CO2 28 11/03/2023    ANIONGAP 12 11/03/2023    BUN 15 11/03/2023    CREATININE 1.05 11/03/2023    GFRMALE 69 11/07/2022    CALCIUM 9.2 11/03/2023    ALBUMIN 4.4 11/07/2022        Lab Results   Component Value Date    HGBA1C 6.3 (H) 03/19/2024     Assessment and Plan    1. Atypical chest pain: Baseline EKG is sinus rhythm normal axis appropriate R wave progression. Risk factors of elevated cholesterol prior tobacco usage. Recent stress test showed appropriate functional status no ischemia by EKG or echocardiogram.  Focus on risk factor modification for now.     2. Elevated LDL: Repeat fasting the panel notable for an LDL of 91 triglycerides of 47 HDL 60.  Calcium score of 0.  Advise aggressive lifestyle modification including heart healthy plant-based diet and exercise.     3. Elevated blood pressure reading: Better controlled today with a blood pressure 128/82.  Advised patient continue to monitor at home.  Continue lifestyle modifications as discussed above.    (This note was generated with voice recognition software and may contain errors including spelling, grammar, syntax and missed recognition of what was dictated, of which may not have been fully corrected)     Buzz Rizvi MD PhD

## 2024-04-22 ENCOUNTER — HOSPITAL ENCOUNTER (OUTPATIENT)
Dept: VASCULAR MEDICINE | Facility: HOSPITAL | Age: 64
Discharge: HOME | End: 2024-04-22
Payer: COMMERCIAL

## 2024-04-22 DIAGNOSIS — L97.111 NON-PRESSURE CHRONIC ULCER OF RIGHT THIGH LIMITED TO BREAKDOWN OF SKIN (MULTI): ICD-10-CM

## 2024-04-22 DIAGNOSIS — L97.221: ICD-10-CM

## 2024-04-22 DIAGNOSIS — L97.211: ICD-10-CM

## 2024-04-22 DIAGNOSIS — I87.333 CHRONIC VENOUS HYPERTENSION (IDIOPATHIC) WITH ULCER AND INFLAMMATION OF BILATERAL LOWER EXTREMITY (MULTI): ICD-10-CM

## 2024-04-22 PROCEDURE — 93970 EXTREMITY STUDY: CPT | Performed by: INTERNAL MEDICINE

## 2024-04-22 PROCEDURE — 93970 EXTREMITY STUDY: CPT

## 2024-04-30 ENCOUNTER — APPOINTMENT (OUTPATIENT)
Dept: CARDIOLOGY | Facility: CLINIC | Age: 64
End: 2024-04-30
Payer: COMMERCIAL

## 2024-05-06 ENCOUNTER — OFFICE VISIT (OUTPATIENT)
Dept: WOUND CARE | Facility: CLINIC | Age: 64
End: 2024-05-06
Payer: COMMERCIAL

## 2024-05-06 VITALS
HEIGHT: 74 IN | DIASTOLIC BLOOD PRESSURE: 87 MMHG | RESPIRATION RATE: 16 BRPM | WEIGHT: 262 LBS | HEART RATE: 58 BPM | BODY MASS INDEX: 33.62 KG/M2 | SYSTOLIC BLOOD PRESSURE: 148 MMHG

## 2024-05-06 DIAGNOSIS — I87.333 CHRONIC VENOUS HYPERTENSION (IDIOPATHIC) WITH ULCER AND INFLAMMATION OF BILATERAL LOWER EXTREMITY (MULTI): ICD-10-CM

## 2024-05-06 DIAGNOSIS — L97.221: Primary | ICD-10-CM

## 2024-05-06 DIAGNOSIS — L97.211: ICD-10-CM

## 2024-05-06 PROCEDURE — 3044F HG A1C LEVEL LT 7.0%: CPT | Performed by: INTERNAL MEDICINE

## 2024-05-06 PROCEDURE — 4004F PT TOBACCO SCREEN RCVD TLK: CPT | Performed by: INTERNAL MEDICINE

## 2024-05-06 PROCEDURE — 3079F DIAST BP 80-89 MM HG: CPT | Performed by: INTERNAL MEDICINE

## 2024-05-06 PROCEDURE — 11042 DBRDMT SUBQ TIS 1ST 20SQCM/<: CPT | Performed by: INTERNAL MEDICINE

## 2024-05-06 PROCEDURE — 3077F SYST BP >= 140 MM HG: CPT | Performed by: INTERNAL MEDICINE

## 2024-05-06 ASSESSMENT — PATIENT HEALTH QUESTIONNAIRE - PHQ9
1. LITTLE INTEREST OR PLEASURE IN DOING THINGS: NOT AT ALL
2. FEELING DOWN, DEPRESSED OR HOPELESS: NOT AT ALL
SUM OF ALL RESPONSES TO PHQ9 QUESTIONS 1 AND 2: 0

## 2024-05-06 ASSESSMENT — COLUMBIA-SUICIDE SEVERITY RATING SCALE - C-SSRS
2. HAVE YOU ACTUALLY HAD ANY THOUGHTS OF KILLING YOURSELF?: NO
1. IN THE PAST MONTH, HAVE YOU WISHED YOU WERE DEAD OR WISHED YOU COULD GO TO SLEEP AND NOT WAKE UP?: NO
6. HAVE YOU EVER DONE ANYTHING, STARTED TO DO ANYTHING, OR PREPARED TO DO ANYTHING TO END YOUR LIFE?: NO

## 2024-05-06 ASSESSMENT — PAIN SCALES - GENERAL: PAINLEVEL: 0-NO PAIN

## 2024-05-06 NOTE — PATIENT INSTRUCTIONS
Assessment/Plan   CVHTN vivian ulcers. +VI - continue the moist promogran and DSD. Compression at all times. Referral to Dr. Mckeon. Follow up 3 weeks.     VISIT ORDERS:  Vascular Study Required: n  Labs Required: n  Radiology Imaging Required: n  Consultation Required: y  Rx Provided:   Changes to Plan of Care: y      NEW ORDERS:  Wash: keith  Irrigate/Soak:  Skin Care: eucerin  Dressing:  moist promogran and DSD  Compression:  ulcer pro  Offloading:     DISCHARGE PLANNING:    Follow Up: 3 weeks  Nurse Visit: prn

## 2024-05-06 NOTE — PROGRESS NOTES
"REFERRAL REQUESTED BY:  Dr. Nat Limon     LAST SEEN:  4/15/23    Patient ID: Domenico Boswell is a 63 y.o. male     HPI:   Here for follow up vivian ankle ulcer, CVHTN and skin changes. VI with reflux vivian. Reports the swelling is better and the pain is better. Came in today without compression - discussed this is an important part of the program and healing.      CURRENT DRESSING:  Who is performing the dressing change:  Patient/Self  Dressing applied: none did not dress wounds today   Dressing Frequency: every 2 days    EDEMA MANAGEMENT:  Compression:  Right: None today wears Juzo Ulcer Pro daily CALF 41.0cm    Left: None today wears Juzo Ulcer Pro daily   CALF 41.5cm      Other Modality  Sleeping in Bed: yes  Elevating FOB:  yes    Offloading/Pressure Relief  Activity Level:  ad leonel  Protection Devices:  none     Offloading/Pressure Relief Effective?     ROS:      Objective     VITALS:  /87 (BP Location: Left arm, Patient Position: Sitting)   Pulse 58   Resp 16   Ht 1.88 m (6' 2\")   Wt 119 kg (262 lb)   BMI 33.64 kg/m²       Physical Exam    ARRIVAL:  Ambulating  TRANSFER:  None    PSYCHIATRIC:  Oriented to person, place and time: A & O x 3    CONSTITUTIONAL:    Appearance:  Well Groomed    SKIN:   LDS, scarring, hemosiderosis and ulcers     PULSES:     EXTREMITY TEMPERATURE:    RIGHT: normal  LEFT: normal    EDEMA: mild    WOUND ASSESSMENT:    Wound 04/15/24 Venous Ulcer Leg Left;Medial (Active)   Date First Assessed: 04/15/24   Primary Wound Type: Venous Ulcer  Location: Leg  Wound Location Orientation: Left;Medial      Assessments 4/15/2024  9:15 AM 5/6/2024  9:15 AM   Wound Image       Site Assessment Pink;Fibrinous;Eschar Eschar   Melissa-Wound Assessment Intact;Erythematous Intact   Non-staged Wound Description Full thickness Full thickness   Shape -- unable to assess wound base   Wound Length (cm) 1.5 cm 1.5 cm   Wound Width (cm) 1.4 cm 0.6 cm   Wound Surface Area (cm^2) 2.1 cm^2 0.9 cm^2 "   Wound Depth (cm) 0.1 cm 0 cm   Wound Volume (cm^3) 0.21 cm^3 0 cm^3   Wound Healing % -- 100   Wound Bed Granulation (%) 10 % --   Wound Bed Eschar (%) 90 % 100 %   Drainage Description Serosanguineous None   Drainage Amount Scant None       Inactive Orders   Date Order Priority Status Authorizing Provider   04/15/24 1014 Debridement Routine Completed Fransisca Jhaveri MD       Wound 04/15/24 Venous Ulcer Leg Left;Dorsal (Active)   Date First Assessed: 04/15/24   Primary Wound Type: Venous Ulcer  Location: Leg  Wound Location Orientation: Left;Dorsal      Assessments 4/15/2024  9:16 AM 5/6/2024  9:13 AM   Wound Image       Site Assessment Eschar Eschar   Melissa-Wound Assessment Intact Intact   Non-staged Wound Description Not applicable Full thickness   Shape -- unable to assess wound base   Wound Length (cm) 1.4 cm 1.4 cm   Wound Width (cm) 1.2 cm 1 cm   Wound Surface Area (cm^2) 1.68 cm^2 1.4 cm^2   Wound Depth (cm) -- 0 cm   Wound Volume (cm^3) -- 0 cm^3   Wound Healing % -- 100   Wound Bed Eschar (%) 100 % 100 %   Drainage Description None None   Drainage Amount None None       Inactive Orders   Date Order Priority Status Authorizing Provider   04/15/24 1015 Debridement Routine Completed Fransisca Jhaveri MD       Wound 04/15/24 Venous Ulcer Leg Left;Dorsal;Distal (Active)   Date First Assessed: 04/15/24   Primary Wound Type: Venous Ulcer  Location: Leg  Wound Location Orientation: Left;Dorsal;Distal      Assessments 4/15/2024  9:17 AM 5/6/2024  9:12 AM   Wound Image       Site Assessment Eschar Eschar   Melissa-Wound Assessment Intact Intact   Non-staged Wound Description Not applicable Full thickness   Shape -- unable to assess wound base   Wound Length (cm) 1.6 cm 1 cm   Wound Width (cm) 1.6 cm 0.6 cm   Wound Surface Area (cm^2) 2.56 cm^2 0.6 cm^2   Wound Depth (cm) -- 0 cm   Wound Volume (cm^3) -- 0 cm^3   Wound Healing % -- 100   Wound Bed Eschar (%) 100 % 100 %   Drainage Description -- None   Drainage Amount  None None       Inactive Orders   Date Order Priority Status Authorizing Provider   04/15/24 1016 Debridement Routine Completed Fransisca Jhaveri MD       Wound 04/15/24 Venous Ulcer Leg Right;Medial (Active)   Date First Assessed: 04/15/24   Primary Wound Type: Venous Ulcer  Location: Leg  Wound Location Orientation: Right;Medial      Assessments 4/15/2024  9:17 AM 5/6/2024  9:10 AM   Wound Image       Site Assessment Eschar Pink;Red;Fibrinous;Granulation   Melissa-Wound Assessment Intact Intact   Non-staged Wound Description Not applicable Full thickness   Wound Length (cm) 1.6 cm 1.1 cm   Wound Width (cm) 0.6 cm 0.9 cm   Wound Surface Area (cm^2) 0.96 cm^2 0.99 cm^2   Wound Depth (cm) -- 0.3 cm   Wound Volume (cm^3) -- 0.297 cm^3   Wound Healing % -- -725   Wound Bed Granulation (%) -- 25 %   Wound Bed Eschar (%) 100 % --   Drainage Description -- Serosanguineous   Drainage Amount None Scant       Inactive Orders   Date Order Priority Status Authorizing Provider   04/15/24 1018 Debridement Routine Completed Fransisca Jhaveri MD       Wound 04/15/24 Venous Ulcer Leg Right;Medial;Distal (Active)   Date First Assessed: 04/15/24   Primary Wound Type: Venous Ulcer  Location: Leg  Wound Location Orientation: Right;Medial;Distal      Assessments 4/15/2024  9:22 AM 5/6/2024  9:09 AM   Wound Image       Site Assessment Red;Eschar Eschar   Melissa-Wound Assessment Intact Intact   Non-staged Wound Description Full thickness Full thickness   Wound Length (cm) 4 cm 3 cm   Wound Width (cm) 4.2 cm 2.6 cm   Wound Surface Area (cm^2) 16.8 cm^2 7.8 cm^2   Wound Depth (cm) 0.1 cm 0 cm   Wound Volume (cm^3) 1.68 cm^3 0 cm^3   Wound Healing % -- 100   Wound Bed Eschar (%) 95 % 100 %   Drainage Description Serous None   Drainage Amount Small None       Inactive Orders   Date Order Priority Status Authorizing Provider   04/15/24 1018 Debridement Routine Completed Fransisca Jhaveri MD          Debridement   Wound 04/15/24 Venous Ulcer Leg  Right;Medial    Performed by: Fransisca Jhaveri MD  Authorized by: Fransisca Jhaveri MD      Consent   Consent obtained? verbal  Consent given by: patient    Debridement Details  Performed by: physician  Debridement type: surgical  Level of debridement: subcutaneous tissue  Pain control: lidocaine 2%  Pain control administration type: topical    Pre-debridement measurements  Length (cm): 1.1  Width (cm): 0.9  Depth (cm): 0.3  Surface Area (cm^2): 0.99    Post-debridement measurements  Length (cm): 1.1  Width (cm): 1  Depth (cm): 0.3  Percent debrided: 100%  Surface Area (cm^2): 1.1  Area Debrided (cm^2): 1.1  Volume (cm^3): 0.33    Tissue and other material debrided: subcutaneous tissue  Devitalized tissue debrided: fibrin and slough  Instrument(s) utilized: blade and curette  Bleeding: small  Hemostasis obtained with: not applicable  Response to treatment: procedure was tolerated well    Debridement   Wound 04/15/24 Venous Ulcer Leg Right;Medial;Distal    Performed by: Fransisca Jhaveri MD  Authorized by: rFansisca Jhaveri MD      Consent   Consent given by: patient    Debridement Details  Performed by: physician  Debridement type: surgical  Level of debridement: subcutaneous tissue  Pain control: lidocaine 2%  Pain control administration type: topical    Pre-debridement measurements  Length (cm): 3  Width (cm): 2.6  Depth (cm): 0  Surface Area (cm^2): 7.8    Post-debridement measurements  Length (cm): 0.6  Width (cm): 1.3  Depth (cm): 0.1  Percent debrided: 100%  Surface Area (cm^2): 0.78  Area Debrided (cm^2): 0.78  Volume (cm^3): 0.08    Tissue and other material debrided: subcutaneous tissue  Devitalized tissue debrided: fibrin and slough  Instrument(s) utilized: blade  Bleeding: small  Hemostasis obtained with: not applicable  Response to treatment: procedure was tolerated well    Debridement   Wound 04/15/24 Venous Ulcer Leg Left;Dorsal    Performed by: Fransisca Jhaveri MD  Authorized by: Fransisca Jhaveri MD       Consent   Consent given by: patient    Debridement Details  Performed by: physician  Debridement type: surgical  Level of debridement: subcutaneous tissue  Pain control: lidocaine 2%  Pain control administration type: topical    Pre-debridement measurements  Length (cm): 1.4  Width (cm): 1  Depth (cm): 0  Surface Area (cm^2): 1.4    Post-debridement measurements  Length (cm): 1.4  Width (cm): 1  Depth (cm): 0.1  Percent debrided: 100%  Surface Area (cm^2): 1.4  Area Debrided (cm^2): 1.4  Volume (cm^3): 0.14    Tissue and other material debrided: subcutaneous tissue  Devitalized tissue debrided: fibrin and slough  Instrument(s) utilized: blade  Bleeding: small  Hemostasis obtained with: not applicable  Response to treatment: procedure was tolerated well    Debridement   Wound 04/15/24 Venous Ulcer Leg Left;Dorsal;Distal    Performed by: Fransisca Jhaveri MD  Authorized by: Fransisca Jhaveri MD      Consent   Consent obtained? verbal  Consent given by: patient    Debridement Details  Performed by: physician  Debridement type: surgical  Level of debridement: subcutaneous tissue  Pain control: lidocaine 2%  Pain control administration type: topical    Pre-debridement measurements  Length (cm): 1  Width (cm): 0.6  Depth (cm): 0  Surface Area (cm^2): 0.6    Post-debridement measurements  Length (cm): 1  Width (cm): 0.6  Depth (cm): 0.1  Percent debrided: 100%  Surface Area (cm^2): 0.6  Area Debrided (cm^2): 0.6  Volume (cm^3): 0.06    Tissue and other material debrided: subcutaneous tissue  Devitalized tissue debrided: fibrin and slough  Instrument(s) utilized: blade  Bleeding: small  Hemostasis obtained with: not applicable  Response to treatment: procedure was tolerated well        Assessment/Plan   CVHTN vivian ulcers. +VI - continue the moist promogran and DSD. Compression at all times. Referral to Dr. Mckeon. Follow up 3 weeks.     VISIT ORDERS:  Vascular Study Required: n  Labs Required: n  Radiology Imaging Required:  n  Consultation Required: y  Rx Provided:   Changes to Plan of Care: y      NEW ORDERS:  Wash: keith  Irrigate/Soak:  Skin Care: eucerin  Dressing:  moist promogran and DSD  Compression:  ulcer pro  Offloading:     DISCHARGE PLANNING:    Follow Up: 3 weeks  Nurse Visit: prn    General Instructions:  Center RN to apply EMLA/Lidocaine 1% jelly/LMX  topically to wound(s) prior to debridement by physician.  Center RN my see patient as a nurse consult in my absence.      RN Post Procedure Note:      Moist promogran to all wound bases with compact 4 x 4 on left medial wound.  Patient instructed on wound care, no supplies needed to be ordered per the patient.   Double tubigrip applied. LOREN Strong RN     [facility]/Assisted Living  Agency/Facility:   days/week:  Contacted Regarding Changes:

## 2024-05-30 ENCOUNTER — OFFICE VISIT (OUTPATIENT)
Dept: WOUND CARE | Facility: CLINIC | Age: 64
End: 2024-05-30
Payer: COMMERCIAL

## 2024-05-30 VITALS
HEIGHT: 74 IN | SYSTOLIC BLOOD PRESSURE: 143 MMHG | WEIGHT: 260.5 LBS | HEART RATE: 65 BPM | BODY MASS INDEX: 33.43 KG/M2 | DIASTOLIC BLOOD PRESSURE: 85 MMHG | RESPIRATION RATE: 18 BRPM

## 2024-05-30 DIAGNOSIS — I87.333 CHRONIC VENOUS HYPERTENSION (IDIOPATHIC) WITH ULCER AND INFLAMMATION OF BILATERAL LOWER EXTREMITY (MULTI): ICD-10-CM

## 2024-05-30 DIAGNOSIS — L97.211: Primary | ICD-10-CM

## 2024-05-30 DIAGNOSIS — L97.221: ICD-10-CM

## 2024-05-30 PROCEDURE — 3044F HG A1C LEVEL LT 7.0%: CPT | Performed by: INTERNAL MEDICINE

## 2024-05-30 PROCEDURE — 99214 OFFICE O/P EST MOD 30 MIN: CPT | Performed by: INTERNAL MEDICINE

## 2024-05-30 PROCEDURE — 3079F DIAST BP 80-89 MM HG: CPT | Performed by: INTERNAL MEDICINE

## 2024-05-30 PROCEDURE — 3077F SYST BP >= 140 MM HG: CPT | Performed by: INTERNAL MEDICINE

## 2024-05-30 NOTE — PROGRESS NOTES
"REFERRAL REQUESTED BY:  Nat Limon MD PCP - General    LAST SEEN:  05/06/2024    Patient ID: Domenico Boswell is a 63 y.o. male     HPI:   Here for follow up CVHTN and calf ulcers. Doing well. Got the ulcer pro but did not bring today and not using.     CURRENT DRESSING:  Who is performing the dressing change:  Patient/Self  Dressing applied: Promogran and DSD  Dressing Frequency: Every 3 days    EDEMA MANAGEMENT:  Compression:  Right: Double Tubigrip CALF 44.0 CM   Left: Double Tubigrip   CALF 44.5 CM     Other Modality  Sleeping in Bed: yes  Elevating FOB:  yes    Offloading/Pressure Relief  Activity Level:  ad leonel  Protection Devices:  NA    Offloading/Pressure Relief Effective?     ROS:      Objective     VITALS:  /85 (BP Location: Left arm)   Pulse 65   Resp 18   Ht 1.88 m (6' 2\")   Wt 118 kg (260 lb 8 oz)   BMI 33.45 kg/m²       Physical Exam    ARRIVAL:  Ambulating  TRANSFER:  None    PSYCHIATRIC:  Oriented to person, place and time: A & O x 3    CONSTITUTIONAL:    Appearance:  Well Groomed and Well Nourished    SKIN:   LDS, hemosiderin staining  vivian     PULSES:     EXTREMITY TEMPERATURE:    RIGHT: normal  LEFT: normal    EDEMA: minimal edema    WOUND ASSESSMENT:    Wound 04/15/24 Venous Ulcer Leg Left;Medial (Active)   Date First Assessed: 04/15/24   Primary Wound Type: Venous Ulcer  Location: Leg  Wound Location Orientation: Left;Medial      Assessments 4/15/2024  9:15 AM 5/30/2024  4:18 PM   Wound Image       Site Assessment Pink;Fibrinous;Eschar --   Melissa-Wound Assessment Intact;Erythematous --   Non-staged Wound Description Full thickness --   Shape -- healed   Wound Length (cm) 1.5 cm --   Wound Width (cm) 1.4 cm --   Wound Surface Area (cm^2) 2.1 cm^2 --   Wound Depth (cm) 0.1 cm --   Wound Volume (cm^3) 0.21 cm^3 --   Wound Bed Granulation (%) 10 % --   Wound Bed Eschar (%) 90 % --   Drainage Description Serosanguineous --   Drainage Amount Scant --       Inactive Orders "   Date Order Priority Status Authorizing Provider   04/15/24 1014 Debridement Routine Completed Fransisca Jhaveri MD       Wound 04/15/24 Venous Ulcer Leg Left;Dorsal (Active)   Date First Assessed: 04/15/24   Primary Wound Type: Venous Ulcer  Location: Leg  Wound Location Orientation: Left;Dorsal      Assessments 4/15/2024  9:16 AM 5/30/2024  4:18 PM   Wound Image       Site Assessment Eschar Eschar   Melissa-Wound Assessment Intact Intact   Non-staged Wound Description Not applicable Full thickness   Wound Length (cm) 1.4 cm 1 cm   Wound Width (cm) 1.2 cm 0.5 cm   Wound Surface Area (cm^2) 1.68 cm^2 0.5 cm^2   Wound Bed Eschar (%) 100 % --   Drainage Description None --   Drainage Amount None None       Inactive Orders   Date Order Priority Status Authorizing Provider   05/06/24 1018 Debridement Routine Completed Fransisca Jhaveri MD   04/15/24 1015 Debridement Routine Completed Fransisca Jhaveri MD       Wound 04/15/24 Venous Ulcer Leg Left;Dorsal;Distal (Active)   Date First Assessed: 04/15/24   Primary Wound Type: Venous Ulcer  Location: Leg  Wound Location Orientation: Left;Dorsal;Distal      Assessments 4/15/2024  9:17 AM 5/30/2024  4:18 PM   Wound Image       Site Assessment Eschar Fibrinous;Granulation;Pink;Red   Melissa-Wound Assessment Intact Intact   Non-staged Wound Description Not applicable Full thickness   Wound Length (cm) 1.6 cm 0.4 cm   Wound Width (cm) 1.6 cm 0.4 cm   Wound Surface Area (cm^2) 2.56 cm^2 0.16 cm^2   Wound Depth (cm) -- 0.1 cm   Wound Volume (cm^3) -- 0.016 cm^3   Wound Healing % -- 70   Wound Bed Granulation (%) -- 10 %   Wound Bed Eschar (%) 100 % --   Drainage Description -- Serosanguineous   Drainage Amount None Scant       Inactive Orders   Date Order Priority Status Authorizing Provider   05/06/24 1019 Debridement Routine Completed Fransisca Jhaveri MD   04/15/24 1016 Debridement Routine Completed Fransisca Jhaveri MD       Wound 04/15/24 Venous Ulcer Leg Right;Medial (Active)   Date  First Assessed: 04/15/24   Primary Wound Type: Venous Ulcer  Location: Leg  Wound Location Orientation: Right;Medial      Assessments 4/15/2024  9:17 AM 5/30/2024  4:17 PM   Wound Image       Site Assessment Eschar Fibrinous;Granulation;Pink;Red   Melissa-Wound Assessment Intact Intact   Non-staged Wound Description Not applicable Full thickness   Wound Length (cm) 1.6 cm 1 cm   Wound Width (cm) 0.6 cm 0.8 cm   Wound Surface Area (cm^2) 0.96 cm^2 0.8 cm^2   Wound Depth (cm) -- 0.1 cm   Wound Volume (cm^3) -- 0.08 cm^3   Wound Healing % -- -122   Wound Bed Granulation (%) -- 50 %   Wound Bed Eschar (%) 100 % --   Drainage Description -- Serosanguineous   Drainage Amount None Small       Inactive Orders   Date Order Priority Status Authorizing Provider   05/06/24 1017 Debridement Routine Completed Fransisca Jhaveri MD   04/15/24 1018 Debridement Routine Completed Fransisca Jhaveri MD       Wound 04/15/24 Venous Ulcer Leg Right;Medial;Distal (Active)   Date First Assessed: 04/15/24   Primary Wound Type: Venous Ulcer  Location: Leg  Wound Location Orientation: Right;Medial;Distal      Assessments 4/15/2024  9:22 AM 5/30/2024  4:17 PM   Wound Image       Site Assessment Red;Eschar Eschar   Melissa-Wound Assessment Intact Intact   Non-staged Wound Description Full thickness Full thickness   Wound Length (cm) 4 cm 0.6 cm   Wound Width (cm) 4.2 cm 1.1 cm   Wound Surface Area (cm^2) 16.8 cm^2 0.66 cm^2   Wound Depth (cm) 0.1 cm --   Wound Volume (cm^3) 1.68 cm^3 --   Wound Bed Eschar (%) 95 % --   Drainage Description Serous --   Drainage Amount Small None       Inactive Orders   Date Order Priority Status Authorizing Provider   05/06/24 1018 Debridement Routine Completed Fransisca Jhaveri MD   04/15/24 1018 Debridement Routine Completed Fransisca Jhaveri MD          Procedures    Assessment/Plan   Doing well. LLE healed. All healed right except one. Feels better with the compression. Continue the moist promogran. Continue the  compression. Discussed using the ulcer pro. Follow up 4 weeks.     VISIT ORDERS:  Vascular Study Required: n  Labs Required: n  Radiology Imaging Required: n  Consultation Required: n  Rx Provided:   Changes to Plan of Care: y      NEW ORDERS:  Wash: soap and water  Irrigate/Soak:  Skin Care: eucerin  Dressing:  moist promogran and bordered gauze - change every other day  Compression: double tubigrip or ulcer pro for compression   Offloading:     DISCHARGE PLANNING:    Follow Up: 4 weeks  Nurse Visit:    General Instructions:  Center RN to apply EMLA/Lidocaine 1% jelly/LMX  topically to wound(s) prior to debridement by physician.  Center RN my see patient as a nurse consult in my absence.      RN Post Procedure Note:    BLE washed with warm soapy water, rinsed, and patted dry. Eucerin to the skin, promogran to the base of the wound covered with bordered gauze. Double tubigrip to BLE for compression. Pt tolerated procedure well. JUDIT Bacon RN, S. MA III    [facility]/Assisted Living  Agency/Facility:   days/week:  Contacted Regarding Changes:

## 2024-05-30 NOTE — PATIENT INSTRUCTIONS
Assessment/Plan   Doing well. LLE healed. All healed right except one. Feels better with the compression. Continue the moist promogran. Continue the compression. Discussed using the ulcer pro. Follow up 4 weeks.     VISIT ORDERS:  Vascular Study Required: n  Labs Required: n  Radiology Imaging Required: n  Consultation Required: n  Rx Provided:   Changes to Plan of Care: y      NEW ORDERS:  Wash: soap and water  Irrigate/Soak:  Skin Care: eucerin  Dressing:  moist promogran and bordered gauze - change every other day  Compression: double tubigrip or ulcer pro for compression   Offloading:     DISCHARGE PLANNING:    Follow Up: 4 weeks  Nurse Visit:

## 2024-06-27 ENCOUNTER — APPOINTMENT (OUTPATIENT)
Dept: VASCULAR SURGERY | Facility: CLINIC | Age: 64
End: 2024-06-27
Payer: COMMERCIAL

## 2024-06-27 ENCOUNTER — OFFICE VISIT (OUTPATIENT)
Dept: WOUND CARE | Facility: CLINIC | Age: 64
End: 2024-06-27
Payer: COMMERCIAL

## 2024-06-27 VITALS
SYSTOLIC BLOOD PRESSURE: 147 MMHG | BODY MASS INDEX: 33.24 KG/M2 | RESPIRATION RATE: 16 BRPM | HEIGHT: 74 IN | WEIGHT: 259 LBS | HEART RATE: 58 BPM | DIASTOLIC BLOOD PRESSURE: 74 MMHG

## 2024-06-27 DIAGNOSIS — I87.333 CHRONIC VENOUS HYPERTENSION (IDIOPATHIC) WITH ULCER AND INFLAMMATION OF BILATERAL LOWER EXTREMITY (MULTI): Primary | ICD-10-CM

## 2024-06-27 PROCEDURE — 99214 OFFICE O/P EST MOD 30 MIN: CPT | Performed by: INTERNAL MEDICINE

## 2024-06-27 PROCEDURE — 3077F SYST BP >= 140 MM HG: CPT | Performed by: INTERNAL MEDICINE

## 2024-06-27 PROCEDURE — 3044F HG A1C LEVEL LT 7.0%: CPT | Performed by: INTERNAL MEDICINE

## 2024-06-27 PROCEDURE — 4004F PT TOBACCO SCREEN RCVD TLK: CPT | Performed by: INTERNAL MEDICINE

## 2024-06-27 PROCEDURE — 3078F DIAST BP <80 MM HG: CPT | Performed by: INTERNAL MEDICINE

## 2024-06-27 ASSESSMENT — COLUMBIA-SUICIDE SEVERITY RATING SCALE - C-SSRS
6. HAVE YOU EVER DONE ANYTHING, STARTED TO DO ANYTHING, OR PREPARED TO DO ANYTHING TO END YOUR LIFE?: NO
1. IN THE PAST MONTH, HAVE YOU WISHED YOU WERE DEAD OR WISHED YOU COULD GO TO SLEEP AND NOT WAKE UP?: NO
2. HAVE YOU ACTUALLY HAD ANY THOUGHTS OF KILLING YOURSELF?: NO

## 2024-06-27 ASSESSMENT — PAIN SCALES - GENERAL: PAINLEVEL: 0-NO PAIN

## 2024-06-27 NOTE — PROGRESS NOTES
"REFERRAL REQUESTED BY:  Dr. Nat Limon     LAST SEEN:  5/30/24    Patient ID: Domenico Boswell is a 63 y.o. male     HPI:   Here for follow up CVHTN and calf ulcers - healed. Has 20-30 mmHg stocking. Scheduled to see Dr. Friend in Sept.     CURRENT DRESSING:  Who is performing the dressing change:  Patient/Self  Dressing applied: na leaving janes  Dressing Frequency: na     EDEMA MANAGEMENT:  Compression:  Right: Other Juzo Ulcer Pro  CALF 41.0cm    Left: Other Juzo Ulcer Pro    CALF 39.0cm      Other Modality  Sleeping in Bed: yes  Elevating FOB:  no    Offloading/Pressure Relief  Activity Level:  ad leonel  Protection Devices:  none    Offloading/Pressure Relief Effective?     ROS:      Objective     VITALS:  /74 (BP Location: Left arm, Patient Position: Sitting)   Pulse 58   Resp 16   Ht 1.88 m (6' 2\")   Wt 117 kg (259 lb)   BMI 33.25 kg/m²       Physical Exam    ARRIVAL:  Ambulating  TRANSFER:  None    PSYCHIATRIC:  Oriented to person, place and time: A & O x 3    CONSTITUTIONAL:    Appearance:  Well Groomed    SKIN:   LDS, hemosiderin staining     PULSES:     EXTREMITY TEMPERATURE:    RIGHT: normal  LEFT: normal    EDEMA: mild    WOUND ASSESSMENT:    Wound 04/15/24 Venous Ulcer Leg Left;Dorsal (Active)   Date First Assessed: 04/15/24   Primary Wound Type: Venous Ulcer  Location: Leg  Wound Location Orientation: Left;Dorsal      Assessments 4/15/2024  9:16 AM 6/27/2024  3:00 PM   Wound Image       Site Assessment Eschar --   Melissa-Wound Assessment Intact --   Non-staged Wound Description Not applicable --   Shape -- appears closed   Wound Length (cm) 1.4 cm --   Wound Width (cm) 1.2 cm --   Wound Surface Area (cm^2) 1.68 cm^2 --   Wound Bed Eschar (%) 100 % --   Drainage Description None --   Drainage Amount None --       Inactive Orders   Date Order Priority Status Authorizing Provider   05/06/24 1018 Debridement Routine Completed Fransisca Jhaveri MD   04/15/24 1015 Debridement Routine " Completed Fransisca Jhaveri MD       Wound 04/15/24 Venous Ulcer Leg Left;Dorsal;Distal (Active)   Date First Assessed: 04/15/24   Primary Wound Type: Venous Ulcer  Location: Leg  Wound Location Orientation: Left;Dorsal;Distal      Assessments 4/15/2024  9:17 AM 6/27/2024  3:00 PM   Wound Image       Site Assessment Eschar --   Melissa-Wound Assessment Intact --   Non-staged Wound Description Not applicable --   Shape -- appears closed   Wound Length (cm) 1.6 cm --   Wound Width (cm) 1.6 cm --   Wound Surface Area (cm^2) 2.56 cm^2 --   Wound Bed Eschar (%) 100 % --   Drainage Amount None --       Inactive Orders   Date Order Priority Status Authorizing Provider   05/06/24 1019 Debridement Routine Completed Fransisca Jhaveri MD   04/15/24 1016 Debridement Routine Completed Fransisca Jhaveri MD       Wound 04/15/24 Venous Ulcer Leg Right;Medial (Active)   Date First Assessed: 04/15/24   Primary Wound Type: Venous Ulcer  Location: Leg  Wound Location Orientation: Right;Medial      Assessments 4/15/2024  9:17 AM 6/27/2024  3:00 PM   Wound Image       Site Assessment Eschar --   Melissa-Wound Assessment Intact --   Non-staged Wound Description Not applicable --   Shape -- appears closed   Wound Length (cm) 1.6 cm --   Wound Width (cm) 0.6 cm --   Wound Surface Area (cm^2) 0.96 cm^2 --   Wound Bed Eschar (%) 100 % --   Drainage Amount None --       Inactive Orders   Date Order Priority Status Authorizing Provider   05/06/24 1017 Debridement Routine Completed Fransisca Jhaveri MD   04/15/24 1018 Debridement Routine Completed Fransisca Jhaveri MD       Wound 04/15/24 Venous Ulcer Leg Right;Medial;Distal (Active)   Date First Assessed: 04/15/24   Primary Wound Type: Venous Ulcer  Location: Leg  Wound Location Orientation: Right;Medial;Distal      Assessments 4/15/2024  9:22 AM 6/27/2024  3:01 PM   Wound Image       Site Assessment Red;Eschar --   Melissa-Wound Assessment Intact --   Non-staged Wound Description Full thickness --   Shape  -- appears closed   Wound Length (cm) 4 cm --   Wound Width (cm) 4.2 cm --   Wound Surface Area (cm^2) 16.8 cm^2 --   Wound Depth (cm) 0.1 cm --   Wound Volume (cm^3) 1.68 cm^3 --   Wound Bed Eschar (%) 95 % --   Drainage Description Serous --   Drainage Amount Small --       Inactive Orders   Date Order Priority Status Authorizing Provider   05/06/24 1018 Debridement Routine Completed Fransisca Jhaveri MD   04/15/24 1018 Debridement Routine Completed Fransisca Jhaveri MD          Procedures    Assessment/Plan   Ulcer is helaed. Discussed continued skin care. Discussed risk for ulcer recidivisam. Has appt for the reflux with Dr. Friend. Follow up here 6 weeks. VISIT ORDERS:  Vascular Study Required: n  Labs Required: n  Radiology Imaging Required: n  Consultation Required: n  Rx Provided:   Changes to Plan of Care: y      NEW ORDERS:  Wash:  Irrigate/Soak:  Skin Care: eucerin  Dressing:   Compression:  mmHg stocking  Offloading:     DISCHARGE PLANNING:    Follow Up: 6 weeks  Nurse Visit:    General Instructions:  Center RN to apply EMLA/Lidocaine 1% jelly/LMX  topically to wound(s) prior to debridement by physician.  Center RN my see patient as a nurse consult in my absence.      RN Post Procedure Note:        HCC/ECF/Assisted Living  Agency/Facility:   days/week:  Contacted Regarding Changes:

## 2024-06-27 NOTE — PATIENT INSTRUCTIONS
Assessment/Plan   Ulcer is helaed. Discussed continued skin care. Discussed risk for ulcer recidivisam. Has appt for the reflux with Dr. Friend. Follow up here 6 weeks. VISIT ORDERS:  Vascular Study Required: n  Labs Required: n  Radiology Imaging Required: n  Consultation Required: n  Rx Provided:   Changes to Plan of Care: y      NEW ORDERS:  Wash:  Irrigate/Soak:  Skin Care: eucerin  Dressing:   Compression:  mmHg stocking  Offloading:     DISCHARGE PLANNING:    Follow Up: 6 weeks  Nurse Visit:

## 2024-08-08 ENCOUNTER — APPOINTMENT (OUTPATIENT)
Dept: WOUND CARE | Facility: CLINIC | Age: 64
End: 2024-08-08
Payer: COMMERCIAL

## 2024-08-15 ENCOUNTER — APPOINTMENT (OUTPATIENT)
Dept: WOUND CARE | Facility: CLINIC | Age: 64
End: 2024-08-15
Payer: COMMERCIAL

## 2024-09-20 ENCOUNTER — APPOINTMENT (OUTPATIENT)
Dept: PRIMARY CARE | Facility: CLINIC | Age: 64
End: 2024-09-20
Payer: COMMERCIAL

## 2024-10-10 ENCOUNTER — APPOINTMENT (OUTPATIENT)
Dept: VASCULAR SURGERY | Facility: CLINIC | Age: 64
End: 2024-10-10
Payer: COMMERCIAL

## 2024-10-24 ENCOUNTER — APPOINTMENT (OUTPATIENT)
Dept: VASCULAR SURGERY | Facility: CLINIC | Age: 64
End: 2024-10-24
Payer: COMMERCIAL

## 2025-04-01 PROBLEM — H91.93 HEARING DIFFICULTY OF BOTH EARS: Status: RESOLVED | Noted: 2024-04-08 | Resolved: 2025-04-01

## 2025-04-01 PROBLEM — E66.811 CLASS 1 OBESITY WITH BODY MASS INDEX (BMI) OF 33.0 TO 33.9 IN ADULT: Status: ACTIVE | Noted: 2023-05-01

## 2025-04-01 PROBLEM — H61.23 IMPACTED CERUMEN OF BOTH EARS: Status: RESOLVED | Noted: 2024-04-08 | Resolved: 2025-04-01

## 2025-04-23 ENCOUNTER — APPOINTMENT (OUTPATIENT)
Dept: CARDIOLOGY | Facility: CLINIC | Age: 65
End: 2025-04-23
Payer: COMMERCIAL

## 2025-08-12 ENCOUNTER — APPOINTMENT (OUTPATIENT)
Dept: OTOLARYNGOLOGY | Facility: CLINIC | Age: 65
End: 2025-08-12
Payer: COMMERCIAL

## 2025-08-12 VITALS — BODY MASS INDEX: 33.69 KG/M2 | HEIGHT: 74 IN | WEIGHT: 262.5 LBS

## 2025-08-12 DIAGNOSIS — H61.23 BILATERAL IMPACTED CERUMEN: Primary | ICD-10-CM

## 2025-08-12 DIAGNOSIS — R49.0 HOARSENESS: ICD-10-CM

## 2025-08-12 DIAGNOSIS — R12 HEARTBURN: ICD-10-CM

## 2025-08-12 PROCEDURE — 3008F BODY MASS INDEX DOCD: CPT | Performed by: NURSE PRACTITIONER

## 2025-08-12 PROCEDURE — 69210 REMOVE IMPACTED EAR WAX UNI: CPT | Performed by: NURSE PRACTITIONER

## 2025-08-12 PROCEDURE — 99213 OFFICE O/P EST LOW 20 MIN: CPT | Performed by: NURSE PRACTITIONER

## 2025-08-12 ASSESSMENT — PATIENT HEALTH QUESTIONNAIRE - PHQ9
SUM OF ALL RESPONSES TO PHQ9 QUESTIONS 1 AND 2: 0
1. LITTLE INTEREST OR PLEASURE IN DOING THINGS: NOT AT ALL
2. FEELING DOWN, DEPRESSED OR HOPELESS: NOT AT ALL

## 2025-08-18 ENCOUNTER — APPOINTMENT (OUTPATIENT)
Dept: OTOLARYNGOLOGY | Facility: CLINIC | Age: 65
End: 2025-08-18
Payer: COMMERCIAL

## 2025-08-18 VITALS — WEIGHT: 262 LBS | BODY MASS INDEX: 33.62 KG/M2 | HEIGHT: 74 IN

## 2025-08-18 DIAGNOSIS — K21.9 LPRD (LARYNGOPHARYNGEAL REFLUX DISEASE): ICD-10-CM

## 2025-08-18 DIAGNOSIS — K21.9 LARYNGOPHARYNGEAL REFLUX (LPR): ICD-10-CM

## 2025-08-18 DIAGNOSIS — R05.9 COUGH, UNSPECIFIED TYPE: ICD-10-CM

## 2025-08-18 DIAGNOSIS — J34.89 NASAL AND SINUS DISCHARGE: ICD-10-CM

## 2025-08-18 DIAGNOSIS — R49.0 HOARSENESS OF VOICE: Primary | ICD-10-CM

## 2025-08-18 PROCEDURE — 99214 OFFICE O/P EST MOD 30 MIN: CPT

## 2025-08-18 PROCEDURE — 31579 LARYNGOSCOPY TELESCOPIC: CPT

## 2025-08-18 PROCEDURE — 3008F BODY MASS INDEX DOCD: CPT

## 2025-08-18 RX ORDER — AZELASTINE 1 MG/ML
2 SPRAY, METERED NASAL 2 TIMES DAILY
Qty: 30 ML | Refills: 3 | Status: SHIPPED | OUTPATIENT
Start: 2025-08-18 | End: 2025-09-17

## 2025-08-18 RX ORDER — OMEPRAZOLE 40 MG/1
40 CAPSULE, DELAYED RELEASE ORAL
Qty: 30 CAPSULE | Refills: 0 | Status: SHIPPED | OUTPATIENT
Start: 2025-08-18 | End: 2025-09-17

## 2025-08-18 RX ORDER — MAGNESIUM CARB/ALUMINUM HYDROX 105-160MG
2 TABLET,CHEWABLE ORAL NIGHTLY
Qty: 60 TABLET | Refills: 1 | Status: SHIPPED | OUTPATIENT
Start: 2025-08-18 | End: 2026-08-18

## 2025-08-25 ENCOUNTER — OFFICE VISIT (OUTPATIENT)
Dept: CARDIOLOGY | Facility: CLINIC | Age: 65
End: 2025-08-25
Payer: COMMERCIAL

## 2025-08-25 VITALS
BODY MASS INDEX: 34.24 KG/M2 | WEIGHT: 266.8 LBS | SYSTOLIC BLOOD PRESSURE: 132 MMHG | HEIGHT: 74 IN | OXYGEN SATURATION: 98 % | HEART RATE: 61 BPM | DIASTOLIC BLOOD PRESSURE: 70 MMHG

## 2025-08-25 DIAGNOSIS — R06.02 SHORTNESS OF BREATH: Primary | ICD-10-CM

## 2025-08-25 DIAGNOSIS — I87.2 VENOUS INSUFFICIENCY OF LOWER EXTREMITY: ICD-10-CM

## 2025-08-25 PROCEDURE — 93010 ELECTROCARDIOGRAM REPORT: CPT | Performed by: STUDENT IN AN ORGANIZED HEALTH CARE EDUCATION/TRAINING PROGRAM

## 2025-08-25 PROCEDURE — 93005 ELECTROCARDIOGRAM TRACING: CPT | Performed by: STUDENT IN AN ORGANIZED HEALTH CARE EDUCATION/TRAINING PROGRAM

## 2025-08-25 PROCEDURE — 99202 OFFICE O/P NEW SF 15 MIN: CPT | Mod: 25 | Performed by: STUDENT IN AN ORGANIZED HEALTH CARE EDUCATION/TRAINING PROGRAM

## 2025-08-25 PROCEDURE — 3075F SYST BP GE 130 - 139MM HG: CPT | Performed by: STUDENT IN AN ORGANIZED HEALTH CARE EDUCATION/TRAINING PROGRAM

## 2025-08-25 PROCEDURE — 3078F DIAST BP <80 MM HG: CPT | Performed by: STUDENT IN AN ORGANIZED HEALTH CARE EDUCATION/TRAINING PROGRAM

## 2025-08-25 PROCEDURE — 3008F BODY MASS INDEX DOCD: CPT | Performed by: STUDENT IN AN ORGANIZED HEALTH CARE EDUCATION/TRAINING PROGRAM

## 2025-08-29 LAB
ATRIAL RATE: 58 BPM
P AXIS: 63 DEGREES
P OFFSET: 169 MS
P ONSET: 110 MS
PR INTERVAL: 190 MS
Q ONSET: 205 MS
QRS COUNT: 9 BEATS
QRS DURATION: 120 MS
QT INTERVAL: 430 MS
QTC CALCULATION(BAZETT): 422 MS
QTC FREDERICIA: 424 MS
R AXIS: 29 DEGREES
T AXIS: 55 DEGREES
T OFFSET: 420 MS
VENTRICULAR RATE: 58 BPM

## 2025-09-05 ENCOUNTER — HOSPITAL ENCOUNTER (OUTPATIENT)
Dept: CARDIOLOGY | Facility: HOSPITAL | Age: 65
Discharge: HOME | End: 2025-09-05
Payer: COMMERCIAL

## 2025-09-05 DIAGNOSIS — R06.02 SHORTNESS OF BREATH: ICD-10-CM

## 2025-09-05 DIAGNOSIS — I87.2 VENOUS INSUFFICIENCY OF LOWER EXTREMITY: ICD-10-CM

## 2025-09-05 LAB
AORTIC VALVE MEAN GRADIENT: 2 MMHG
AORTIC VALVE PEAK VELOCITY: 0.93 M/S
AV PEAK GRADIENT: 3 MMHG
AVA (PEAK VEL): 3.29 CM2
AVA (VTI): 2.89 CM2
EJECTION FRACTION APICAL 4 CHAMBER: 56.8
EJECTION FRACTION: 53 %
LEFT ATRIUM VOLUME AREA LENGTH INDEX BSA: 20.8 ML/M2
LEFT VENTRICLE INTERNAL DIMENSION DIASTOLE: 5.5 CM (ref 3.5–6)
LEFT VENTRICULAR OUTFLOW TRACT DIAMETER: 1.99 CM
MITRAL VALVE E/A RATIO: 0.72
RIGHT VENTRICLE PEAK SYSTOLIC PRESSURE: 20 MMHG
TRICUSPID ANNULAR PLANE SYSTOLIC EXCURSION: 1.7 CM

## 2025-09-05 PROCEDURE — 93306 TTE W/DOPPLER COMPLETE: CPT | Performed by: STUDENT IN AN ORGANIZED HEALTH CARE EDUCATION/TRAINING PROGRAM

## 2025-09-05 PROCEDURE — 93306 TTE W/DOPPLER COMPLETE: CPT

## 2025-09-10 ENCOUNTER — APPOINTMENT (OUTPATIENT)
Dept: CARDIOLOGY | Facility: HOSPITAL | Age: 65
End: 2025-09-10
Payer: COMMERCIAL

## 2025-09-12 ENCOUNTER — APPOINTMENT (OUTPATIENT)
Dept: PRIMARY CARE | Facility: CLINIC | Age: 65
End: 2025-09-12
Payer: COMMERCIAL

## 2025-12-03 ENCOUNTER — APPOINTMENT (OUTPATIENT)
Dept: CARDIOLOGY | Facility: CLINIC | Age: 65
End: 2025-12-03
Payer: COMMERCIAL

## 2026-08-12 ENCOUNTER — APPOINTMENT (OUTPATIENT)
Dept: OTOLARYNGOLOGY | Facility: CLINIC | Age: 66
End: 2026-08-12
Payer: COMMERCIAL